# Patient Record
Sex: MALE | Race: OTHER | HISPANIC OR LATINO | URBAN - METROPOLITAN AREA
[De-identification: names, ages, dates, MRNs, and addresses within clinical notes are randomized per-mention and may not be internally consistent; named-entity substitution may affect disease eponyms.]

---

## 2023-01-06 ENCOUNTER — EMERGENCY (EMERGENCY)
Facility: HOSPITAL | Age: 58
LOS: 1 days | Discharge: HOME | End: 2023-01-06
Attending: EMERGENCY MEDICINE
Payer: SELF-PAY

## 2023-01-06 VITALS
OXYGEN SATURATION: 98 % | HEART RATE: 77 BPM | RESPIRATION RATE: 18 BRPM | TEMPERATURE: 98 F | SYSTOLIC BLOOD PRESSURE: 117 MMHG | DIASTOLIC BLOOD PRESSURE: 76 MMHG

## 2023-01-06 VITALS
RESPIRATION RATE: 18 BRPM | DIASTOLIC BLOOD PRESSURE: 76 MMHG | SYSTOLIC BLOOD PRESSURE: 157 MMHG | OXYGEN SATURATION: 98 % | WEIGHT: 169.76 LBS | TEMPERATURE: 98 F | HEART RATE: 82 BPM

## 2023-01-06 PROCEDURE — 73120 X-RAY EXAM OF HAND: CPT

## 2023-01-06 PROCEDURE — 73110 X-RAY EXAM OF WRIST: CPT

## 2023-01-06 PROCEDURE — 99284 EMERGENCY DEPT VISIT MOD MDM: CPT

## 2023-01-06 PROCEDURE — 73120 X-RAY EXAM OF HAND: CPT | Mod: 26,LT

## 2023-01-06 PROCEDURE — 73110 X-RAY EXAM OF WRIST: CPT | Mod: 26,LT

## 2023-01-06 RX ORDER — IBUPROFEN 200 MG
600 TABLET ORAL ONCE
Refills: 0 | Status: COMPLETED | OUTPATIENT
Start: 2023-01-06 | End: 2023-01-06

## 2023-01-06 RX ADMIN — Medication 600 MILLIGRAM(S): at 13:50

## 2023-01-06 NOTE — ED PROVIDER NOTE - PHYSICAL EXAMINATION
----- Message from Herlinda Paris sent at 11/13/2020  2:27 PM CST -----  Regarding: meds  Good afternoon,      Pt stating she is having an episode of Diverticulitis and needs her two medications called into her pharmacy. Pt can be reached at [phone]      [facility] - [place] [id number] [address]  Phone: [phone] Fax: [phone]          Thanks,  Herlinda Paris     radial pulse intact

## 2023-01-06 NOTE — ED PROVIDER NOTE - MUSCULOSKELETAL, MLM
left arm- no swelling, no deformity, no rashes, compartment soft. pain with active rom of wrist. finger strength 5/5, sensation intact. passive rom minimal discomfort.

## 2023-01-06 NOTE — ED PROVIDER NOTE - CLINICAL SUMMARY MEDICAL DECISION MAKING FREE TEXT BOX
57 yr old male with hx of HTn, HLD presents to ed c/o left forearm and hand pain with inability to make a close fist since 1 day. worsening at night. no trauma, no fever, no rashes, no visual changes, no headache, no n/v, no numbness or tingling. nothing new. at work does go in and out of hot and cold environment.    left wrist/forearm discomfort likely tendonitis as evident by active rom causes discomfort but passive no issue. will obtain xr to r/o occult fx. otherwise. please use heat packs to area 3x/day 20 mins each session, take motrin 600mg every 6 hrs as needed, tylenol 650mg every 4 hrs as needed, stay active, no heavy lifting and return if symptoms  worsens. see your MD for physical therapy. should last about 1-2 week.

## 2023-01-06 NOTE — ED PROVIDER NOTE - OBJECTIVE STATEMENT
57 yr old male with hx of HTn, HLD presents to ed c/o left forearm and hand pain with inability to make a close fist since 1 day. worsening at night. no trauma, no fever, no rashes, no visual changes, no headache, no n/v, no numbness or tingling. nothing new. at work does go in and out of hot and cold environment.

## 2023-01-06 NOTE — ED PROVIDER NOTE - PATIENT PORTAL LINK FT
You can access the FollowMyHealth Patient Portal offered by [facility] by registering at the following website: [email or web address]. By joining [facility]’s FollowMyHealth portal, you will also be able to view your health information using other applications (apps) compatible with our system.

## 2023-01-06 NOTE — ED PROVIDER NOTE - NSFOLLOWUPINSTRUCTIONS_ED_ALL_ED_FT
please use heat packs to area 3x/day 20 mins each session, take motrin 600mg every 6 hrs as needed, tylenol 650mg every 4 hrs as needed, stay active, no heavy lifting and return if symptoms  worsens. see your MD for physical therapy. should last about 1-2 week.    use bolsas de calor en el área 3 veces al día myles 20 minutos cada sesión, tome motrin 600 mg cada 6 horas según sea necesario, tylenol 650 mg cada 4 horas según sea necesario, manténgase activo, no levante objetos pesados ??y regrese si los síntomas empeoran. consulte a oliveira médico para terapia física. debería durar alrededor de 1-2 semanas.

## 2023-04-28 ENCOUNTER — EMERGENCY (EMERGENCY)
Facility: HOSPITAL | Age: 58
LOS: 1 days | Discharge: HOME | End: 2023-04-28
Attending: EMERGENCY MEDICINE
Payer: MEDICAID

## 2023-04-28 VITALS
OXYGEN SATURATION: 100 % | SYSTOLIC BLOOD PRESSURE: 135 MMHG | HEART RATE: 75 BPM | TEMPERATURE: 98 F | DIASTOLIC BLOOD PRESSURE: 83 MMHG | RESPIRATION RATE: 18 BRPM

## 2023-04-28 VITALS
TEMPERATURE: 98 F | HEART RATE: 74 BPM | DIASTOLIC BLOOD PRESSURE: 84 MMHG | SYSTOLIC BLOOD PRESSURE: 146 MMHG | RESPIRATION RATE: 16 BRPM | WEIGHT: 149.91 LBS

## 2023-04-28 PROCEDURE — 73120 X-RAY EXAM OF HAND: CPT | Mod: 26,50

## 2023-04-28 PROCEDURE — 73610 X-RAY EXAM OF ANKLE: CPT

## 2023-04-28 PROCEDURE — 99284 EMERGENCY DEPT VISIT MOD MDM: CPT

## 2023-04-28 PROCEDURE — 73120 X-RAY EXAM OF HAND: CPT

## 2023-04-28 PROCEDURE — 96374 THER/PROPH/DIAG INJ IV PUSH: CPT

## 2023-04-28 PROCEDURE — 73610 X-RAY EXAM OF ANKLE: CPT | Mod: 26,RT

## 2023-04-28 PROCEDURE — 99284 EMERGENCY DEPT VISIT MOD MDM: CPT | Mod: 25

## 2023-04-28 RX ORDER — KETOROLAC TROMETHAMINE 30 MG/ML
30 SYRINGE (ML) INJECTION ONCE
Refills: 0 | Status: DISCONTINUED | OUTPATIENT
Start: 2023-04-28 | End: 2023-04-28

## 2023-04-28 RX ORDER — IBUPROFEN 200 MG
1 TABLET ORAL
Qty: 16 | Refills: 0
Start: 2023-04-28 | End: 2023-05-01

## 2023-04-28 RX ORDER — ACETAMINOPHEN 500 MG
650 TABLET ORAL ONCE
Refills: 0 | Status: COMPLETED | OUTPATIENT
Start: 2023-04-28 | End: 2023-04-28

## 2023-04-28 RX ADMIN — Medication 650 MILLIGRAM(S): at 12:59

## 2023-04-28 RX ADMIN — Medication 650 MILLIGRAM(S): at 13:29

## 2023-04-28 RX ADMIN — Medication 30 MILLIGRAM(S): at 01:00

## 2023-04-28 RX ADMIN — Medication 30 MILLIGRAM(S): at 13:00

## 2023-04-28 NOTE — ED PROVIDER NOTE - PHYSICAL EXAMINATION
Gen. no acute distress, no respiratory   HEENT: Pupils equally round reactive, EOMI, pharynx clear  Lungs: Bilateral breath sounds  CVS: S1S2   Abd; soft nontender nondistended  Ext: Bilateral hand edema no point tenderness, bony tenderness full range of motion sensation intact no scaphoid tenderness no snuffbox tenderness no tenderness with axial loading  Neuro: ANO x3 no focal deficit  MSK: Normal strength of upper and lower extremities

## 2023-04-28 NOTE — ED PROVIDER NOTE - PROGRESS NOTE DETAILS
ATTG: : X-ray with no acute fracture.  Appear to be arthritic changes.  DC home with follow-up assistance for rheumatology in the next 1 to 2 weeks.  Patient feels much better after pain medication.  Return precautions provided.

## 2023-04-28 NOTE — ED PROVIDER NOTE - CLINICAL SUMMARY MEDICAL DECISION MAKING FREE TEXT BOX
ATTG: : Assessment/plan: Hand pain ankle pain foot pain likely secondary to inflammation.  We will check x-ray, pain medication reeval disposition

## 2023-04-28 NOTE — ED PROVIDER NOTE - OBJECTIVE STATEMENT
58-year-old male with no significant past medical history presents for pain in his hands and foot.  Patient states began approximately 3 months ago.  He works heavily in construction and noticed some swelling of both of his hands.  There is no recent trauma.  He also has swelling in his right foot ankle.  He was told that he may have rheumatoid arthritis and is scheduled to see rheumatologist but is not for another 3 weeks.  He is here with pain.  There is no new fever there is no chills nose no skin changes no new concerns

## 2023-04-28 NOTE — ED PROVIDER NOTE - NSFOLLOWUPINSTRUCTIONS_ED_ALL_ED_FT
Your diagnosis this visit was:  Pain in both hands and ankle.     From this ED visit you were prescribed: Ibuprofen 600 mg take as directed.     You may be contacted by our Emergency Department Referrals Coordinator to set up your follow-up appointment within 24-48 hours of your discharge, Monday through Friday.   We recommend you follow up with: Your medical doctor and a Rheumatologist.   [facility]  Phone  [phone]    Please return to the Emergency Department if you experience any of the following symptoms:  - Shortness of breath or trouble breathing  - Pressure, pain, or tightness in the chest  - Face drooping, arm weakness or speech difficulty,  - Persistent or severe vomiting  - Head injury or loss of consciousness  - Nonstop bleeding or an open wound  Joint Pain    Joint pain may be caused by many things. Joint pain is likely to go away when you follow instructions from your health care provider for relieving pain at home. However, joint pain can also be caused by conditions that require more treatment. Common causes of joint pain include:  Bruising in the area of the joint.  Injury caused by repeating certain movements too many times.  Age-related joint wear and tear.  Buildup of uric acid crystals in the joint (gout).  Inflammation of the joint.  Other forms of arthritis.  Infections of the joint or of the bone.  Your health care provider may recommend that you take pain medicine or wear a supportive device like an elastic bandage, sling, or splint. If your joint pain continues, you may need lab or imaging tests to diagnose the cause of your joint pain.    Follow these instructions at home:  Managing pain, stiffness, and swelling        If directed, put ice on the painful area. To do this:  If you have a removable elastic bandage, sling, or splint, take it off as told by your doctor.  Put ice in a plastic bag.  Place a towel between your skin and the bag.  Leave the ice on for 20 minutes, 2–3 times a day.  Remove the ice if your skin turns bright red. This is very important. If you cannot feel pain, heat, or cold, you have a greater risk of damage to the area.  Move your fingers and toes often to reduce stiffness and swelling.  Raise the injured area above the level of your heart while you are sitting or lying down.  If directed, apply heat to the painful area as often as told by your health care provider. Use the heat source that your health care provider recommends, such as a moist heat pack or a heating pad.  Place a towel between your skin and the heat source.  Leave the heat on for 20–30 minutes.  Remove the heat if your skin turns bright red. This is especially important if you are unable to feel pain, heat, or cold. You have a greater risk of getting burned.  Activity    Rest as told by your health care provider. Do not do anything that causes or worsens pain.  Begin exercising or stretching the affected area as told by your health care provider.  Return to your normal activities as told by your health care provider. Ask your health care provider what activities are safe for you.  If you have an elastic bandage, sling, or splint:    Wear the bandage, sling, or splint as told by your health care provider. Remove it only as told by your health care provider.  Loosen it if your fingers or toes below the joint tingle, become numb, or turn cold and blue.  Keep it clean.  Ask your health care provider if you should remove it before bathing.  If the bandage, sling, or splint is not waterproof:  Do not let it get wet.  Cover it with a watertight covering when you take a bath or shower.  General instructions    Treatment may include medicines for pain and inflammation that are taken by mouth or applied to the skin. Take over-the-counter and prescription medicines only as told by your health care provider.  Do not use any products that contain nicotine or tobacco, such as cigarettes, e-cigarettes, and chewing tobacco. If you need help quitting, ask your health care provider.  Keep all follow-up visits. This is important.  Contact a health care provider if:  You have pain that gets worse and does not get better with medicine.  Your joint pain does not improve within 3 days.  You have increased bruising or swelling.  You have a fever.  You lose 10 lb (4.5 kg) or more without trying.  Get help right away if:  You cannot move the joint.  Your fingers or toes tingle, become numb, or turn cold and blue.  You have a fever along with a joint that is red, warm, and swollen.  Summary  Joint pain may be caused by many things.  Your health care provider may recommend that you take pain medicine or wear a supportive device such as an elastic bandage, sling, or splint.  If your joint pain continues, you may need tests to diagnose the cause of your joint pain.  Take over-the-counter and prescription medicines only as told by your health care provider.  This information is not intended to replace advice given to you by your health care provider. Make sure you discuss any questions you have with your health care provider.

## 2023-04-28 NOTE — ED PROVIDER NOTE - PATIENT PORTAL LINK FT
You can access the FollowMyHealth Patient Portal offered by [facility] by registering at the following website: [email or web address]. By joining [facility]’s FollowMyHealth portal, you will also be able to view your health information using other applications (apps) compatible with our system.

## 2023-04-28 NOTE — ED ADULT NURSE NOTE - CAS ELECT INFOMATION PROVIDED
Follow-up with Primary Care Provider and Rheumatologist. Return to the ED for new or worsening symptoms./DC instructions

## 2023-07-26 NOTE — ED ADULT NURSE NOTE - PAIN: BODY LOCATION
hands and bilateral foot ankle/Bilateral: Spironolactone Counseling: Patient advised regarding risks of diarrhea, abdominal pain, hyperkalemia, birth defects (for female patients), liver toxicity and renal toxicity. The patient may need blood work to monitor liver and kidney function and potassium levels while on therapy. The patient verbalized understanding of the proper use and possible adverse effects of spironolactone.  All of the patient's questions and concerns were addressed.

## 2023-10-13 NOTE — ED ADULT NURSE NOTE - NURSING MUSC FOOT SYMPTOMS RIGHT
pain Colchicine Counseling:  Patient counseled regarding adverse effects including but not limited to stomach upset (nausea, vomiting, stomach pain, or diarrhea).  Patient instructed to limit alcohol consumption while taking this medication.  Colchicine may reduce blood counts especially with prolonged use.  The patient understands that monitoring of kidney function and blood counts may be required, especially at baseline. The patient verbalized understanding of the proper use and possible adverse effects of colchicine.  All of the patient's questions and concerns were addressed.